# Patient Record
Sex: MALE | Race: WHITE | Employment: FULL TIME | URBAN - METROPOLITAN AREA
[De-identification: names, ages, dates, MRNs, and addresses within clinical notes are randomized per-mention and may not be internally consistent; named-entity substitution may affect disease eponyms.]

---

## 2017-02-08 ENCOUNTER — TELEPHONE (OUTPATIENT)
Dept: FAMILY MEDICINE | Facility: CLINIC | Age: 22
End: 2017-02-08

## 2017-02-08 NOTE — TELEPHONE ENCOUNTER
Panel Management Review      Patient has the following on his problem list:     Depression / Dysthymia review  PHQ-9 SCORE 4/8/2016 8/22/2016 12/3/2016   Total Score - - -   Total Score 13 6 9      Patient is due for:  PHQ9      Composite cancer screening  Chart review shows that this patient is due/due soon for the following None  Summary:    Patient is due/failing the following:   PHQ9    Action needed:   Patient needs to do PHQ9.    Type of outreach:    Phone, spoke to patient.  PHQ-9 completed    Questions for provider review:    None                                                                                                                                    Madhuri Nowak CMA       Chart routed to Care Team .

## 2017-02-09 ASSESSMENT — PATIENT HEALTH QUESTIONNAIRE - PHQ9: SUM OF ALL RESPONSES TO PHQ QUESTIONS 1-9: 10

## 2017-06-03 ENCOUNTER — HEALTH MAINTENANCE LETTER (OUTPATIENT)
Age: 22
End: 2017-06-03

## 2017-10-13 ENCOUNTER — OFFICE VISIT (OUTPATIENT)
Dept: FAMILY MEDICINE | Facility: CLINIC | Age: 22
End: 2017-10-13
Payer: COMMERCIAL

## 2017-10-13 VITALS
WEIGHT: 216 LBS | SYSTOLIC BLOOD PRESSURE: 124 MMHG | TEMPERATURE: 97.9 F | RESPIRATION RATE: 20 BRPM | BODY MASS INDEX: 30.92 KG/M2 | DIASTOLIC BLOOD PRESSURE: 76 MMHG | HEART RATE: 105 BPM | HEIGHT: 70 IN | OXYGEN SATURATION: 96 %

## 2017-10-13 DIAGNOSIS — J20.9 ACUTE BRONCHITIS, UNSPECIFIED ORGANISM: Primary | ICD-10-CM

## 2017-10-13 PROCEDURE — 99213 OFFICE O/P EST LOW 20 MIN: CPT | Performed by: FAMILY MEDICINE

## 2017-10-13 RX ORDER — PREDNISONE 20 MG/1
20 TABLET ORAL DAILY
Qty: 5 TABLET | Refills: 0 | Status: SHIPPED | OUTPATIENT
Start: 2017-10-13 | End: 2018-04-13

## 2017-10-13 RX ORDER — AZITHROMYCIN 250 MG/1
TABLET, FILM COATED ORAL
Qty: 6 TABLET | Refills: 0 | Status: SHIPPED | OUTPATIENT
Start: 2017-10-13 | End: 2018-04-13

## 2017-10-13 NOTE — PATIENT INSTRUCTIONS
Recommend flonase 2 sprays in each nostril once a day  Increase fluids  Humidfier use at night   Mucinex DM 1 tablet daily   Complete course of steroids and antibiotics  Follow up as needed    Bronchitis, Antibiotic Treatment (Adult)    Bronchitis is an infection of the air passages (bronchial tubes) in your lungs. It often occurs when you have a cold. This illness is contagious during the first few days and is spread through the air by coughing and sneezing, or by direct contact (touching the sick person and then touching your own eyes, nose, or mouth).  Symptoms of bronchitis include cough with mucus (phlegm) and low-grade fever. Bronchitis usually lasts 7 to 14 days. Mild cases can be treated with simple home remedies. More severe infection is treated with an antibiotic.  Home care  Follow these guidelines when caring for yourself at home:    If your symptoms are severe, rest at home for the first 2 to 3 days. When you go back to your usual activities, don't let yourself get too tired.    Do not smoke. Also avoid being exposed to secondhand smoke.    You may use over-the-counter medicines to control fever or pain, unless another medicine was prescribed. (Note: If you have chronic liver or kidney disease or have ever had a stomach ulcer or gastrointestinal bleeding, talk with your healthcare provider before using these medicines. Also talk to your provider if you are taking medicine to prevent blood clots.) Aspirin should never be given to anyone younger than 18 years of age who is ill with a viral infection or fever. It may cause severe liver or brain damage.    Your appetite may be poor, so a light diet is fine. Avoid dehydration by drinking 6 to 8 glasses of fluids per day (such as water, soft drinks, sports drinks, juices, tea, or soup). Extra fluids will help loosen secretions in the nose and lungs.    Over-the-counter cough, cold, and sore-throat medicines will not shorten the length of the illness, but they  may be helpful to reduce symptoms. (Note: Do not use decongestants if you have high blood pressure.)    Finish all antibiotic medicine. Do this even if you are feeling better after only a few days.  Follow-up care  Follow up with your healthcare provider, or as advised. If you had an X-ray or ECG (electrocardiogram), a specialist will review it. You will be notified of any new findings that may affect your care.  Note: If you are age 65 or older, or if you have a chronic lung disease or condition that affects your immune system, or you smoke, talk to your healthcare provider about having pneumococcal vaccinations and a yearly influenza vaccination (flu shot).  When to seek medical advice  Call your healthcare provider right away if any of these occur:    Fever of 100.4 F (38 C) or higher    Coughing up increased amounts of colored sputum    Weakness, drowsiness, headache, facial pain, ear pain, or a stiff neck  Call 911, or get immediate medical care  Contact emergency services right away if any of these occur.    Coughing up blood    Worsening weakness, drowsiness, headache, or stiff neck    Trouble breathing, wheezing, or pain with breathing  Date Last Reviewed: 9/13/2015 2000-2017 [employer]. [address] [place] [address], [place] [address]. All rights reserved. This information is not intended as a substitute for professional medical care. Always follow your healthcare professional's instructions.

## 2017-10-13 NOTE — NURSING NOTE
"Chief Complaint   Patient presents with     Cough     cough x2 weeks       Initial /76 (BP Location: Right arm, Patient Position: Chair, Cuff Size: Adult Large)  Pulse 105  Temp 97.9  F (36.6  C) (Oral)  Resp 20  Ht 5' 10\" (1.778 m)  Wt 216 lb (98 kg)  SpO2 96%  BMI 30.99 kg/m2 Estimated body mass index is 30.99 kg/(m^2) as calculated from the following:    Height as of this encounter: 5' 10\" (1.778 m).    Weight as of this encounter: 216 lb (98 kg).  Medication Reconciliation: complete     Brittany Honeycutt/JHONY  Alfred---[facility]      "

## 2017-10-13 NOTE — MR AVS SNAPSHOT
After Visit Summary   10/13/2017    Alex Mensah    MRN: [id number]           Patient Information     Date Of Birth          1995        Visit Information        Provider Department      10/13/2017 4:00 PM Zoe Schneider MD [facility]        Today's Diagnoses     Acute bronchitis, unspecified organism    -  1      Care Instructions    Recommend flonase 2 sprays in each nostril once a day  Increase fluids  Humidfier use at night   Mucinex DM 1 tablet daily   Complete course of steroids and antibiotics  Follow up as needed    Bronchitis, Antibiotic Treatment (Adult)    Bronchitis is an infection of the air passages (bronchial tubes) in your lungs. It often occurs when you have a cold. This illness is contagious during the first few days and is spread through the air by coughing and sneezing, or by direct contact (touching the sick person and then touching your own eyes, nose, or mouth).  Symptoms of bronchitis include cough with mucus (phlegm) and low-grade fever. Bronchitis usually lasts 7 to 14 days. Mild cases can be treated with simple home remedies. More severe infection is treated with an antibiotic.  Home care  Follow these guidelines when caring for yourself at home:    If your symptoms are severe, rest at home for the first 2 to 3 days. When you go back to your usual activities, don't let yourself get too tired.    Do not smoke. Also avoid being exposed to secondhand smoke.    You may use over-the-counter medicines to control fever or pain, unless another medicine was prescribed. (Note: If you have chronic liver or kidney disease or have ever had a stomach ulcer or gastrointestinal bleeding, talk with your healthcare provider before using these medicines. Also talk to your provider if you are taking medicine to prevent blood clots.) Aspirin should never be given to anyone younger than 18 years of age who is ill with a viral infection or fever. It may  cause severe liver or brain damage.    Your appetite may be poor, so a light diet is fine. Avoid dehydration by drinking 6 to 8 glasses of fluids per day (such as water, soft drinks, sports drinks, juices, tea, or soup). Extra fluids will help loosen secretions in the nose and lungs.    Over-the-counter cough, cold, and sore-throat medicines will not shorten the length of the illness, but they may be helpful to reduce symptoms. (Note: Do not use decongestants if you have high blood pressure.)    Finish all antibiotic medicine. Do this even if you are feeling better after only a few days.  Follow-up care  Follow up with your healthcare provider, or as advised. If you had an X-ray or ECG (electrocardiogram), a specialist will review it. You will be notified of any new findings that may affect your care.  Note: If you are age 65 or older, or if you have a chronic lung disease or condition that affects your immune system, or you smoke, talk to your healthcare provider about having pneumococcal vaccinations and a yearly influenza vaccination (flu shot).  When to seek medical advice  Call your healthcare provider right away if any of these occur:    Fever of 100.4 F (38 C) or higher    Coughing up increased amounts of colored sputum    Weakness, drowsiness, headache, facial pain, ear pain, or a stiff neck  Call 911, or get immediate medical care  Contact emergency services right away if any of these occur.    Coughing up blood    Worsening weakness, drowsiness, headache, or stiff neck    Trouble breathing, wheezing, or pain with breathing  Date Last Reviewed: 9/13/2015 2000-2017 [employer]. [address] [place] [address], [place] [address]. All rights reserved. This information is not intended as a substitute for professional medical care. Always follow your healthcare professional's instructions.                Follow-ups after your visit        Who to contact     If you have questions or need follow up  "information about today's clinic visit or your schedule please contact [facility] directly at [phone].  Normal or non-critical lab and imaging results will be communicated to you by uberlifehart, letter or phone within 4 business days after the clinic has received the results. If you do not hear from us within 7 days, please contact the clinic through uberlifehart or phone. If you have a critical or abnormal lab result, we will notify you by phone as soon as possible.  Submit refill requests through [employer] or call your pharmacy and they will forward the refill request to us. Please allow 3 business days for your refill to be completed.          Additional Information About Your Visit        uberlifeharTherMark Information     [employer] gives you secure access to your electronic health record. If you see a primary care provider, you can also send messages to your care team and make appointments. If you have questions, please call your primary care clinic.  If you do not have a primary care provider, please call [phone] and they will assist you.        Care EveryWhere ID     This is your Care EveryWhere ID. This could be used by other organizations to access your [place] medical records  [id number]        Your Vitals Were     Pulse Temperature Respirations Height Pulse Oximetry BMI (Body Mass Index)    105 97.9  F (36.6  C) (Oral) 20 5' 10\" (1.778 m) 96% 30.99 kg/m2       Blood Pressure from Last 3 Encounters:   10/13/17 124/76   11/25/16 134/87   08/22/16 132/82    Weight from Last 3 Encounters:   10/13/17 216 lb (98 kg)   11/25/16 201 lb (91.2 kg)   08/22/16 204 lb (92.5 kg)              Today, you had the following     No orders found for display         Today's Medication Changes          These changes are accurate as of: 10/13/17  4:26 PM.  If you have any questions, ask your nurse or doctor.               Start taking these medicines.        Dose/Directions    azithromycin 250 MG tablet   Commonly known " as:  ZITHROMAX   Used for:  Acute bronchitis, unspecified organism   Started by:  Zoe Schneider MD        Two tablets first day, then one tablet daily for four days.   Quantity:  6 tablet   Refills:  0       predniSONE 20 MG tablet   Commonly known as:  DELTASONE   Used for:  Acute bronchitis, unspecified organism   Started by:  Zoe Schneider MD        Dose:  20 mg   Take 1 tablet (20 mg) by mouth daily   Quantity:  5 tablet   Refills:  0            Where to get your medicines      These medications were [facility] to [facility]/pharm[facility] #[id number] - [place], [place] [facility] [address]  [address] [address]     Phone:  [phone]     azithromycin 250 MG tablet    predniSONE 20 MG tablet                Primary Care Provider Office Phone # Fax #    Paul Nilsa Bravo PA-C [phone]       [address] [address]        Equal Access to Services     MICHAEL [facility] Center[facility]RIANNA : Hadii silvia ku hadasho Soomaali, waaxda luqadaha, qaybta kaalmada adeegyada, waxay kyliein haygale ruiz . So [facility] [phone].    ATENCIÓN: Si habla español, tiene a sadler disposición servicios gratuitos de asistencia lingüística. Llame al [phone].    We comply with applicable federal civil rights laws and [place] laws. We do not discriminate on the basis of race, color, national origin, age, disability, sex, sexual orientation, or gender identity.            Thank you!     Thank you for choosing [facility]  for your care. Our goal is always to provide you with excellent care. Hearing back from our patients is one way we can continue to improve our services. Please take a few minutes to complete the written survey that you may receive in the mail after your visit with us. Thank you!             Your Updated Medication List - Protect others around you: Learn how to safely use, store and throw away your medicines at www.disposemymeds.org.           This list is accurate as of: 10/13/17  4:26 PM.  Always use your most recent med list.                   Brand Name Dispense Instructions for use Diagnosis    azithromycin 250 MG tablet    ZITHROMAX    6 tablet    Two tablets first day, then one tablet daily for four days.    Acute bronchitis, unspecified organism       predniSONE 20 MG tablet    DELTASONE    5 tablet    Take 1 tablet (20 mg) by mouth daily    Acute bronchitis, unspecified organism

## 2017-10-13 NOTE — PROGRESS NOTES
"  SUBJECTIVE:   Alex Mensah is a 21 year old male who presents to clinic today for the following health issues:      Acute Illness   Acute illness concerns: cough and chest congestion  Onset: x2 weeks    Fever: no    Chills/Sweats: no    Headache (location?): no    Sinus Pressure:no    Conjunctivitis:  no    Ear Pain: no    Rhinorrhea: no    Congestion: no    Sore Throat: no    PND: YES     Cough: YES    Wheeze: no     Shortness of breath: mostly with coughing     Decreased Appetite: YES    Nausea: no    Vomiting: no    Diarrhea:  no    Dysuria/Freq.: no    Fatigue/Achiness: no    Sick/Strep Exposure: no     Therapies Tried and outcome: water, cough drops, mucinex             Problem list and histories reviewed & adjusted, as indicated.  Additional history: as documented    Patient Active Problem List   Diagnosis     Pes planus     Elbow fracture, left     Major depression in partial remission (H)     Allergic rhinitis due to other allergen (ALLERGY)     Warts     Common wart     GONZALEZ (generalized anxiety disorder)     Past Surgical History:   Procedure Laterality Date     ELBOW SURGERY  1/2011    Left elbow fx- ORIF.        Social History   Substance Use Topics     Smoking status: Never Smoker     Smokeless tobacco: Never Used     Alcohol use 0.0 oz/week     0 Standard drinks or equivalent per week      Comment: occ     Family History   Problem Relation Age of Onset     Myocardial Infarction Paternal Grandfather              Reviewed and updated as needed this visit by clinical staffTobacco  Allergies       Reviewed and updated as needed this visit by Provider         ROS:  Constitutional, HEENT,pulmonary systems are negative, except as otherwise noted.      OBJECTIVE:   /76 (BP Location: Right arm, Patient Position: Chair, Cuff Size: Adult Large)  Pulse 105  Temp 97.9  F (36.6  C) (Oral)  Resp 20  Ht 5' 10\" (1.778 m)  Wt 216 lb (98 kg)  SpO2 96%  BMI 30.99 kg/m2  Body mass index is 30.99 " kg/(m^2).  GENERAL: healthy and alert  HENT: ear canals and TM's normal, nose and mouth without ulcers or lesions  NECK: no adenopathy, no asymmetry, masses, or scars and thyroid normal to palpation  RESP: mild coarse breath sounds, no wheezing, significant coughing fits with some shortness of breath   CV: regular rate and rhythm, normal S1 S2, no S3 or S4, no murmur, click or rub, no peripheral edema and peripheral pulses strong    Diagnostic Test Results:  none     ASSESSMENT/PLAN:       1. Acute bronchitis, unspecified organism  - will cover with Abx due to length of symptoms.   - azithromycin (ZITHROMAX) 250 MG tablet; Two tablets first day, then one tablet daily for four days.  Dispense: 6 tablet; Refill: 0  - predniSONE (DELTASONE) 20 MG tablet; Take 1 tablet (20 mg) by mouth daily  Dispense: 5 tablet; Refill: 0    See Patient Instructions    Zoe Schneider MD  [facility]

## 2018-04-13 ENCOUNTER — OFFICE VISIT (OUTPATIENT)
Dept: FAMILY MEDICINE | Facility: CLINIC | Age: 23
End: 2018-04-13
Payer: COMMERCIAL

## 2018-04-13 VITALS
WEIGHT: 208 LBS | BODY MASS INDEX: 29.78 KG/M2 | DIASTOLIC BLOOD PRESSURE: 83 MMHG | TEMPERATURE: 97.5 F | HEIGHT: 70 IN | SYSTOLIC BLOOD PRESSURE: 137 MMHG | RESPIRATION RATE: 16 BRPM | HEART RATE: 71 BPM

## 2018-04-13 DIAGNOSIS — R09.82 POST-NASAL DRIP: ICD-10-CM

## 2018-04-13 DIAGNOSIS — R07.0 THROAT PAIN: Primary | ICD-10-CM

## 2018-04-13 LAB
DEPRECATED S PYO AG THROAT QL EIA: NORMAL
SPECIMEN SOURCE: NORMAL

## 2018-04-13 PROCEDURE — 99213 OFFICE O/P EST LOW 20 MIN: CPT | Performed by: FAMILY MEDICINE

## 2018-04-13 PROCEDURE — 87081 CULTURE SCREEN ONLY: CPT | Performed by: FAMILY MEDICINE

## 2018-04-13 PROCEDURE — 87880 STREP A ASSAY W/OPTIC: CPT | Performed by: FAMILY MEDICINE

## 2018-04-13 RX ORDER — FLUTICASONE PROPIONATE 50 MCG
1-2 SPRAY, SUSPENSION (ML) NASAL DAILY
Qty: 1 BOTTLE | Refills: 11 | Status: SHIPPED | OUTPATIENT
Start: 2018-04-13 | End: 2021-09-29

## 2018-04-13 ASSESSMENT — ANXIETY QUESTIONNAIRES
6. BECOMING EASILY ANNOYED OR IRRITABLE: NOT AT ALL
1. FEELING NERVOUS, ANXIOUS, OR ON EDGE: SEVERAL DAYS
GAD7 TOTAL SCORE: 2
5. BEING SO RESTLESS THAT IT IS HARD TO SIT STILL: NOT AT ALL
7. FEELING AFRAID AS IF SOMETHING AWFUL MIGHT HAPPEN: NOT AT ALL
3. WORRYING TOO MUCH ABOUT DIFFERENT THINGS: NOT AT ALL
2. NOT BEING ABLE TO STOP OR CONTROL WORRYING: SEVERAL DAYS
IF YOU CHECKED OFF ANY PROBLEMS ON THIS QUESTIONNAIRE, HOW DIFFICULT HAVE THESE PROBLEMS MADE IT FOR YOU TO DO YOUR WORK, TAKE CARE OF THINGS AT HOME, OR GET ALONG WITH OTHER PEOPLE: NOT DIFFICULT AT ALL

## 2018-04-13 ASSESSMENT — PATIENT HEALTH QUESTIONNAIRE - PHQ9: 5. POOR APPETITE OR OVEREATING: NOT AT ALL

## 2018-04-13 NOTE — PROGRESS NOTES
"  SUBJECTIVE:   Alex Mensah is a 22 year old male who presents to clinic today for the following health issues:    Patient complains of sore throat, congestion, and productive cough for a week. He also notes \"raspy\" voice over past couple of days. He has been treating with mucinex and zyrtec.     Problem list and histories reviewed & adjusted, as indicated.  Additional history: none    Reviewed and updated as needed this visit by clinical staff  Tobacco  Allergies  Meds  Med Hx  Surg Hx  Fam Hx  Soc Hx       Past Medical History:   Diagnosis Date     Cellulitis      Common wart 8/22/2016     GONZALEZ (generalized anxiety disorder) 11/25/2016     Allergic Rhinitis  Past Surgical History:   Procedure Laterality Date     ELBOW SURGERY  1/2011    Left elbow fx- ORIF.        Family History   Problem Relation Age of Onset     Myocardial Infarction Paternal Grandfather        Social History   Substance Use Topics     Smoking status: Never Smoker     Smokeless tobacco: Never Used     Alcohol use 0.0 oz/week     0 Standard drinks or equivalent per week      Comment: occ       Reviewed and updated as needed this visit by Provider         ROS:  Positive for rhinitis  Negative for fevers, GI sx    This document serves as a record of the services and decisions personally performed and made by Dipak Negro MD. It was created on his behalf by Julianne Garcia, a trained medical scribe.  The creation of this document is based on the scribe's personal observations and the provider's statements to the medical scribe.  Julianne Garcia, April 13, 2018 12:11 PM    OBJECTIVE:     /83 (BP Location: Right arm, Patient Position: Chair, Cuff Size: Adult Regular)  Pulse 71  Temp 97.5  F (36.4  C) (Oral)  Resp 16  Ht 1.778 m (5' 10\")  Wt 94.3 kg (208 lb)  BMI 29.84 kg/m2  Body mass index is 29.84 kg/(m^2).    Exam  ENT: posterior pharyngeal lymphoid streaks.  CHEST: Clear to auscultation, respirations unlabored      Strep test " negative   ASSESSMENT/PLAN:   ASSESSMENT / PLAN:  (R07.0) Throat pain  (primary encounter diagnosis)  Comment:   Plan: Strep, Rapid Screen, Beta strep group A culture            (R09.82) Post-nasal drip  Comment: Versus allergic or viral Rx  Plan: fluticasone (FLONASE) 50 MCG/ACT spray             The information in this document, created by the medical scribe for me, accurately reflects the services I personally performed and the decisions made by me. I have reviewed and approved this document for accuracy prior to leaving the patient care area.  Dipak Negro MD April 13, 2018 12:11 PM    Dipak Negro MD  [facility]

## 2018-04-13 NOTE — NURSING NOTE
"Chief Complaint   Patient presents with     URI     Sinuses x week       Initial /83 (BP Location: Right arm, Patient Position: Chair, Cuff Size: Adult Regular)  Pulse 71  Temp 97.5  F (36.4  C) (Oral)  Resp 16  Ht 5' 10\" (1.778 m)  Wt 208 lb (94.3 kg)  BMI 29.84 kg/m2 Estimated body mass index is 29.84 kg/(m^2) as calculated from the following:    Height as of this encounter: 5' 10\" (1.778 m).    Weight as of this encounter: 208 lb (94.3 kg).  Medication Reconciliation: complete RT ARM Kiesha Weaver MA      "

## 2018-04-13 NOTE — MR AVS SNAPSHOT
"              After Visit Summary   4/13/2018    Alex Mensah    MRN: [id number]           Patient Information     Date Of Birth          1995        Visit Information        Provider Department      4/13/2018 11:45 AM Dipak Negro MD [facility]        Today's Diagnoses     Throat pain    -  1    Post-nasal drip           Follow-ups after your visit        Who to contact     If you have questions or need follow up information about today's clinic visit or your schedule please contact [facility] directly at [phone].  Normal or non-critical lab and imaging results will be communicated to you by Urbasolarhart, letter or phone within 4 business days after the clinic has received the results. If you do not hear from us within 7 days, please contact the clinic through [employer] or phone. If you have a critical or abnormal lab result, we will notify you by phone as soon as possible.  Submit refill requests through [employer] or call your pharmacy and they will forward the refill request to us. Please allow 3 business days for your refill to be completed.          Additional Information About Your Visit        MyChart Information     [employer] gives you secure access to your electronic health record. If you see a primary care provider, you can also send messages to your care team and make appointments. If you have questions, please call your primary care clinic.  If you do not have a primary care provider, please call [phone] and they will assist you.        Care EveryWhere ID     This is your Care EveryWhere ID. This could be used by other organizations to access your [place] medical records  [id number]        Your Vitals Were     Pulse Temperature Respirations Height BMI (Body Mass Index)       71 97.5  F (36.4  C) (Oral) 16 5' 10\" (1.778 m) 29.84 kg/m2        Blood Pressure from Last 3 Encounters:   04/13/18 137/83   10/13/17 124/76   11/25/16 134/87    Weight from Last " 3 Encounters:   04/13/18 208 lb (94.3 kg)   10/13/17 216 lb (98 kg)   11/25/16 201 lb (91.2 kg)              We Performed the Following     Beta strep group A culture     Strep, Rapid Screen          Today's Medication Changes          These changes are accurate as of 4/13/18  8:04 PM.  If you have any questions, ask your nurse or doctor.               Start taking these medicines.        Dose/Directions    fluticasone 50 MCG/ACT spray   Commonly known as:  FLONASE   Used for:  Post-nasal drip   Started by:  Dipak Negro MD        Dose:  1-2 spray   Spray 1-2 sprays into both nostrils daily   Quantity:  1 Bottle   Refills:  11            Where to get your medicines      These medications were sent to [facility] [address] [facility] [address]  [address] , [facility] [address]     Phone:  [phone]     fluticasone 50 MCG/ACT spray                Primary Care Provider Office Phone # Fax #    Paul Nilsa Bravo PA-C [phone]       [address] [address]        Equal Access to Services     [facility] Center[facility]RIANNA AH: Hadii silvia salazar hadasho Soomaali, waaxda luqadaha, qaybta kaalmada adeegyada, shane cansecon srini esteves. So [facility] [phone].    ATENCIÓN: Si habla español, tiene a sadler disposición servicios gratuitos de asistencia lingüística. [facility] [phone].    We comply with applicable federal civil rights laws and [place] laws. We do not discriminate on the basis of race, color, national origin, age, disability, sex, sexual orientation, or gender identity.            Thank you!     Thank you for choosing [facility]  for your care. Our goal is always to provide you with excellent care. Hearing back from our patients is one way we can continue to improve our services. Please take a few minutes to complete the written survey that you may receive in the mail after  your visit with us. Thank you!             Your Updated Medication List - Protect others around you: Learn how to safely use, store and throw away your medicines at www.disposemymeds.org.          This list is accurate as of 4/13/18  8:04 PM.  Always use your most recent med list.                   Brand Name Dispense Instructions for use Diagnosis    fluticasone 50 MCG/ACT spray    FLONASE    1 Bottle    Spray 1-2 sprays into both nostrils daily    Post-nasal drip

## 2018-04-14 LAB
BACTERIA SPEC CULT: NORMAL
SPECIMEN SOURCE: NORMAL

## 2018-04-14 ASSESSMENT — ANXIETY QUESTIONNAIRES: GAD7 TOTAL SCORE: 2

## 2018-04-14 ASSESSMENT — PATIENT HEALTH QUESTIONNAIRE - PHQ9: SUM OF ALL RESPONSES TO PHQ QUESTIONS 1-9: 4

## 2018-06-12 ENCOUNTER — OFFICE VISIT (OUTPATIENT)
Dept: FAMILY MEDICINE | Facility: CLINIC | Age: 23
End: 2018-06-12
Payer: COMMERCIAL

## 2018-06-12 VITALS
BODY MASS INDEX: 29.84 KG/M2 | WEIGHT: 208 LBS | RESPIRATION RATE: 16 BRPM | HEART RATE: 93 BPM | TEMPERATURE: 98.4 F | OXYGEN SATURATION: 96 % | DIASTOLIC BLOOD PRESSURE: 80 MMHG | SYSTOLIC BLOOD PRESSURE: 126 MMHG

## 2018-06-12 DIAGNOSIS — L03.116 CELLULITIS OF LEFT LOWER EXTREMITY: Primary | ICD-10-CM

## 2018-06-12 PROCEDURE — 87070 CULTURE OTHR SPECIMN AEROBIC: CPT | Performed by: FAMILY MEDICINE

## 2018-06-12 PROCEDURE — 87186 SC STD MICRODIL/AGAR DIL: CPT | Mod: 59 | Performed by: FAMILY MEDICINE

## 2018-06-12 PROCEDURE — 99214 OFFICE O/P EST MOD 30 MIN: CPT | Performed by: FAMILY MEDICINE

## 2018-06-12 PROCEDURE — 87077 CULTURE AEROBIC IDENTIFY: CPT | Performed by: FAMILY MEDICINE

## 2018-06-12 RX ORDER — MUPIROCIN 20 MG/G
OINTMENT TOPICAL 3 TIMES DAILY
Qty: 22 G | Refills: 1 | Status: SHIPPED | OUTPATIENT
Start: 2018-06-12 | End: 2018-06-17

## 2018-06-12 RX ORDER — SULFAMETHOXAZOLE/TRIMETHOPRIM 800-160 MG
1 TABLET ORAL 2 TIMES DAILY
Qty: 20 TABLET | Refills: 0 | Status: SHIPPED | OUTPATIENT
Start: 2018-06-12 | End: 2019-07-24

## 2018-06-12 NOTE — PROGRESS NOTES
SUBJECTIVE:   Alex Mensah is a 22 year old male presenting with a chief complaint of   Chief Complaint   Patient presents with     Blister     left heel        He is an established patient of [facility].        Review of Systems   Musculoskeletal:        Pain along the left Achilles tendon.  This starts at the insertion working its way up.    There is decreased range of motion secondary to pain   Skin: Positive for rash.   All other systems reviewed and are negative.      Past Medical History:   Diagnosis Date     Cellulitis      Common wart 8/22/2016     GONZALEZ (generalized anxiety disorder) 11/25/2016     Family History   Problem Relation Age of Onset     Myocardial Infarction Paternal Grandfather      Current Outpatient Prescriptions   Medication Sig Dispense Refill     mupirocin (BACTROBAN) 2 % ointment Apply topically 3 times daily for 5 days 22 g 1     sulfamethoxazole-trimethoprim (BACTRIM DS/SEPTRA DS) 800-160 MG per tablet Take 1 tablet by mouth 2 times daily 20 tablet 0     fluticasone (FLONASE) 50 MCG/ACT spray Spray 1-2 sprays into both nostrils daily 1 Bottle 11     Social History   Substance Use Topics     Smoking status: Never Smoker     Smokeless tobacco: Never Used     Alcohol use 0.0 oz/week     0 Standard drinks or equivalent per week      Comment: occ       OBJECTIVE  /80  Pulse 93  Temp 98.4  F (36.9  C) (Oral)  Resp 16  Wt 208 lb (94.3 kg)  SpO2 96%  BMI 29.84 kg/m2    Physical Exam   Constitutional: He is oriented to person, place, and time. He appears well-developed.   HENT:   Head: Normocephalic.   Eyes: EOM are normal. Pupils are equal, round, and reactive to light.   Neck: Normal range of motion. Neck supple.   Cardiovascular: Normal rate and regular rhythm.    Pulmonary/Chest: Effort normal and breath sounds normal.   Abdominal: Soft.   Musculoskeletal: Normal range of motion.   Neurological: He is alert and oriented to person, place, and time.   Nursing note and vitals  reviewed.      Labs:  No results found for this or any previous visit (from the past 24 hour(s)).    X-Ray was not done.    ASSESSMENT:  1. Cellulitis; infection that seems to be spreading along the left Achilles tendon and heel.  She was were causing a pressure sore and now this is obviously become infected.    He was taken the procedure room the area was cleaned prepped and draped and we did remove pustular material that was sent for culture to the laboratory.    He has had staph infections in the past I am not sure if this was MRSA.    2. Tendinitis of achilles.      However we will place him on Septra as well as Bactroban to the area and he is to return to his primary care in the next 2 weeks.    In addition he should return within the next 48 hours if not improving or if this appears to be worse.      Medical Decision Making:    Differential Diagnosis:  Rash: Cellulitis  Dermatitis  Folliculitis  Inflammation    Serious Comorbid Conditions:  Adult:  None    PLAN:    URI Adult:  Tylenol and Ibuprofen    Followup:    If not improving or if condition worsens, follow up with your Primary Care Provider    There are no Patient Instructions on file for this visit.

## 2018-06-12 NOTE — MR AVS SNAPSHOT
After Visit Summary   6/12/2018    Alex Mensah    MRN: [id number]           Patient Information     Date Of Birth          1995        Visit Information        Provider Department      6/12/2018 4:30 PM Bassam Mckeon MD [facility]        Today's Diagnoses     Cellulitis of right lower extremity    -  1       Follow-ups after your visit        Who to contact     If you have questions or need follow up information about today's clinic visit or your schedule please contact [facility] directly at [phone].  Normal or non-critical lab and imaging results will be communicated to you by [employer], letter or phone within 4 business days after the clinic has received the results. If you do not hear from us within 7 days, please contact the clinic through [facility] [employer] or phone. If you have a critical or abnormal lab result, we will notify you by phone as soon as possible.  Submit refill requests through [employer] or call your pharmacy and they will forward the refill request to us. Please allow 3 business days for your refill to be completed.          Additional Information About Your Visit        MyChart Information     [employer] gives you secure access to your electronic health record. If you see a primary care provider, you can also send messages to your care team and make appointments. If you have questions, please call your primary care clinic.  If you do not have a primary care provider, please call [phone] and they will assist you.        Care EveryWhere ID     This is your Care EveryWhere ID. This could be used by other organizations to access your [place] medical records  [id number]        Your Vitals Were     Pulse Temperature Respirations Pulse Oximetry BMI (Body Mass Index)       93 98.4  F (36.9  C) (Oral) 16 96% 29.84 kg/m2        Blood Pressure from Last 3 Encounters:   06/12/18 126/80   04/13/18 137/83   10/13/17 124/76    Weight from Last 3  Encounters:   06/12/18 208 lb (94.3 kg)   04/13/18 208 lb (94.3 kg)   10/13/17 216 lb (98 kg)              Today, you had the following     No orders found for display         Today's Medication Changes          These changes are accurate as of 6/12/18  5:12 PM.  If you have any questions, ask your nurse or doctor.               Start taking these medicines.        Dose/Directions    mupirocin 2 % ointment   Commonly known as:  BACTROBAN   Used for:  Cellulitis of right lower extremity   Started by:  Bassam Mckeon MD        Apply topically 3 times daily for 5 days   Quantity:  22 g   Refills:  1       sulfamethoxazole-trimethoprim 800-160 MG per tablet   Commonly known as:  BACTRIM DS/SEPTRA DS   Used for:  Cellulitis of right lower extremity   Started by:  Bassam Mckeon MD        Dose:  1 tablet   Take 1 tablet by mouth 2 times daily   Quantity:  20 tablet   Refills:  0            Where to get your medicines      These medications were sent to "[facility] [address] [place] [address]  [address] [facility] OF  [address]  [address]  [address], [facility] [address]     Phone:  [phone]     mupirocin 2 % ointment    sulfamethoxazole-trimethoprim 800-160 MG per tablet                Primary Care Provider Office Phone # Fax #    Paul Nilsa Bravo PA-C [phone]       [address] [address]        Equal Access to Services     [facility] HospitalBRIANNA AH: Hadii silvia ku hadasho Soomaali, waaxda luqadaha, qaybta kaalmada adeegyada, waxay skye ruiz ah. So [facility] [phone].    ATENCIÓN: Si habla español, tiene a sadler disposición servicios gratuitos de asistencia lingüística. Carmename al [phone].    We comply with applicable federal civil rights laws and [place] laws. We do not discriminate on the basis of race, color, national origin, age, disability, sex, sexual orientation, or gender identity.            Thank you!      Thank you for choosing [facility]  for your care. Our goal is always to provide you with excellent care. Hearing back from our patients is one way we can continue to improve our services. Please take a few minutes to complete the written survey that you may receive in the mail after your visit with us. Thank you!             Your Updated Medication List - Protect others around you: Learn how to safely use, store and throw away your medicines at www.disposemymeds.org.          This list is accurate as of 6/12/18  5:12 PM.  Always use your most recent med list.                   Brand Name Dispense Instructions for use Diagnosis    fluticasone 50 MCG/ACT spray    FLONASE    1 Bottle    Spray 1-2 sprays into both nostrils daily    Post-nasal drip       mupirocin 2 % ointment    BACTROBAN    22 g    Apply topically 3 times daily for 5 days    Cellulitis of right lower extremity       sulfamethoxazole-trimethoprim 800-160 MG per tablet    BACTRIM DS/SEPTRA DS    20 tablet    Take 1 tablet by mouth 2 times daily    Cellulitis of right lower extremity

## 2018-06-19 LAB
BACTERIA SPEC CULT: ABNORMAL
BACTERIA SPEC CULT: ABNORMAL
Lab: ABNORMAL
SPECIMEN SOURCE: ABNORMAL

## 2019-06-26 ENCOUNTER — OFFICE VISIT (OUTPATIENT)
Dept: FAMILY MEDICINE | Facility: CLINIC | Age: 24
End: 2019-06-26
Payer: COMMERCIAL

## 2019-06-26 VITALS
HEIGHT: 70 IN | TEMPERATURE: 98 F | DIASTOLIC BLOOD PRESSURE: 72 MMHG | WEIGHT: 187 LBS | SYSTOLIC BLOOD PRESSURE: 110 MMHG | RESPIRATION RATE: 16 BRPM | HEART RATE: 73 BPM | BODY MASS INDEX: 26.77 KG/M2

## 2019-06-26 DIAGNOSIS — G47.00 INSOMNIA, UNSPECIFIED TYPE: ICD-10-CM

## 2019-06-26 DIAGNOSIS — R79.89 ELEVATED TSH: Primary | ICD-10-CM

## 2019-06-26 DIAGNOSIS — B07.8 COMMON WART: ICD-10-CM

## 2019-06-26 PROCEDURE — 84443 ASSAY THYROID STIM HORMONE: CPT | Performed by: FAMILY MEDICINE

## 2019-06-26 PROCEDURE — 17110 DESTRUCTION B9 LES UP TO 14: CPT | Performed by: FAMILY MEDICINE

## 2019-06-26 PROCEDURE — 36415 COLL VENOUS BLD VENIPUNCTURE: CPT | Performed by: FAMILY MEDICINE

## 2019-06-26 PROCEDURE — 99213 OFFICE O/P EST LOW 20 MIN: CPT | Mod: 25 | Performed by: FAMILY MEDICINE

## 2019-06-26 RX ORDER — TRAZODONE HYDROCHLORIDE 50 MG/1
25-50 TABLET, FILM COATED ORAL AT BEDTIME
Qty: 60 TABLET | Refills: 0 | Status: SHIPPED | OUTPATIENT
Start: 2019-06-26 | End: 2019-09-09

## 2019-06-26 ASSESSMENT — PATIENT HEALTH QUESTIONNAIRE - PHQ9
SUM OF ALL RESPONSES TO PHQ QUESTIONS 1-9: 5
10. IF YOU CHECKED OFF ANY PROBLEMS, HOW DIFFICULT HAVE THESE PROBLEMS MADE IT FOR YOU TO DO YOUR WORK, TAKE CARE OF THINGS AT HOME, OR GET ALONG WITH OTHER PEOPLE: NOT DIFFICULT AT ALL
SUM OF ALL RESPONSES TO PHQ QUESTIONS 1-9: 5

## 2019-06-26 ASSESSMENT — ANXIETY QUESTIONNAIRES
6. BECOMING EASILY ANNOYED OR IRRITABLE: NOT AT ALL
7. FEELING AFRAID AS IF SOMETHING AWFUL MIGHT HAPPEN: NOT AT ALL
1. FEELING NERVOUS, ANXIOUS, OR ON EDGE: SEVERAL DAYS
GAD7 TOTAL SCORE: 2
GAD7 TOTAL SCORE: 2
4. TROUBLE RELAXING: NOT AT ALL
GAD7 TOTAL SCORE: 2
7. FEELING AFRAID AS IF SOMETHING AWFUL MIGHT HAPPEN: NOT AT ALL
5. BEING SO RESTLESS THAT IT IS HARD TO SIT STILL: NOT AT ALL
3. WORRYING TOO MUCH ABOUT DIFFERENT THINGS: SEVERAL DAYS
2. NOT BEING ABLE TO STOP OR CONTROL WORRYING: NOT AT ALL

## 2019-06-26 ASSESSMENT — MIFFLIN-ST. JEOR: SCORE: 1849.48

## 2019-06-26 NOTE — PATIENT INSTRUCTIONS
Patient Education   start trazodone at 1/2 tablet and increase to 1 tablet if needed  Follow up In 3-4 weeks or sooner if needed   Treating Warts    You and your healthcare provider can talk about what treatment may be best for your wart or warts. To get rid of your warts, your healthcare provider may need to try more than one type of treatment. The methods described below are often used to treat warts.  Types of treatment    Do nothing. Most warts will resolve within 2 years, even without treatment. So doing nothing is sometimes a good option. This is particularly true for smaller warts that are not causing symptoms.    Cryotherapy (liquid nitrogen). This kills skin cells by freezing them. It kills the warts and destroys skin infected by the wart-causing virus. This is done in your healthcare provider s office and will cause some discomfort. It may take several treatments over several weeks to get rid of the warts.    Topical medicines. Prescribed topical medicines can be put on the skin. These are usually applied in the healthcare provider's office. But some prescriptions may be applied at home.    Over-the-counter (OTC) topical treatments. OTC medicines that most often contain salicylic acid may be an option. These patches, liquids, and creams are used at home. The medicine is applied daily to the wart and nearby skin. It's usually left on overnight. The dead skin is filed down the next day. In 1 to 3 days, the procedure can be repeated. Topical treatments are sometimes combined with cryotherapy.    Electrodessication and curettage (ED & C).  For this procedure, the healthcare provider applies numbing medicine to the wart. Then the wart is scraped or cut off. This type of treatment is usually not the first line of therapy.    Laser surgery.  This can vaporize wart tissue or destroy the blood vessels that feed the wart. This is done in the healthcare provider's office.    Shots (injections). These can be used to  treat warts that don t respond to other treatments, such as stubborn or painful warts around the nails. This is done in the healthcare provider s office.  When to seek medical treatment  It s a good idea to have your healthcare provider check your warts. That way your provider can rule out any other skin problems. Sometimes a callous or a corn can look like a wart, but the treatments may differ. Treatment can also provide relief from warts that bleed, burn, hurt, or itch. Genital warts should always be treated. They can spread to other people through sexual contact. And they may cause genital or cervical cancer.  Getting good results  After having your warts treated, new warts may still appear. Don t be discouraged. Warts often come back. See your healthcare provider again to discuss this. Your provider can tell you about the treatments that most likely will help clear your skin of warts.   Date Last Reviewed: 2/1/2017 2000-2018 [employer]. [address] [place] [address]. All rights reserved. This information is not intended as a substitute for professional medical care. Always follow your healthcare professional's instructions.           Patient Education     Insomnia  Insomnia is repeated difficulty going to sleep or staying asleep, or both. Whether you have insomnia is not defined by a specific amount of sleep. Different people need different amounts of sleep, and you may need more or less sleep at different times of your life.  There are 3 major types of insomnia: short-term, chronic, and  other.  Short-term, or acute insomnia lasts less than 3 months. The symptoms are temporary and can be linked directly to a stressor, such as the death of a loved one, financial problems, or a new physical problem. Short-term insomnia stops when the stressor resolves or the person adapts to its presence.  Chronic insomnia occurs at least 3 times a week and lasts longer than 3 months. Chronic  insomnia can occur when either the cause of the sleeping problem is not clear, or the insomnia does not get better when the stressor is resolved. A number of other criteria are also used to make the diagnosis of chronic insomnia.    Other insomnia  is the third type of insomnia-related sleep disorders. This description applies to people who have problems getting to sleep or staying asleep, but don't meet all of the factors that describe either short-term or chronic insomnia.    Many things cause insomnia. Different people may have different causes. It can be from an underlying medical or psychological condition, or lifestyle. It can also be primary insomnia, which means no cause can be found.  Causes of insomnia include:    Chronic medical problems- heart disease, gastrointestinal problems, hormonal changes, breathing problems    Anxiety    Stress    Depression    Pain    Work schedule    Sleep apnea    Illegal drugs    Certain medicines  Many different medicines can affect your sleep, such as stimulants, caffeine, alcohol, some decongestants, and diet pills. Other medicines may include some types of blood pressure pills, steroids, asthma medicines, antihistamines, antidepressants, seizure medicines and statins. Not all of these will affect your sleep, and they shouldn t be stopped without talking to your doctor.  Symptoms of insomnia can include:    Lying awake for long periods at night before falling asleep    Waking up several times during the night    Waking up early in the morning and not being able to get back to sleep    Feeling tired and not refreshed by sleep    Not being able to function properly during the day and finding it hard to concentrate    Irritability    Tiredness and fatigue during the day  Home care    Review your medicines with your doctor or pharmacist to find out if they can cause insomnia. Not all medicines will affect your sleep, but they shouldn't be stopped without reviewing them with  your doctor. There may be serious side effects and consequences from suddenly stopping your medicines. Not taking them may cause strokes, heart attacks, and many other problems.    Caffeine, smoking, and alcohol also affect sleep. Limit your daily use and don't use these before bedtime. Alcohol may make you sleepy at first, but as its effects wear off, you may awaken a few hours later and have trouble returning to sleep.    Don't exercise, eat or drink large amounts of liquid within 2 hours of your bedtime.    Improve your sleep habits. Have a fixed bed and wake-up time. Try to keep noise, light and heat in your bedroom at a comfortable level. Try using earplugs or eyeshades if needed.     Don't watch TV in bed.    If you don't fall asleep within 30 minutes, try to relax by reading or listening to soft music.    Limit daytime napping to one 30 minute period, early in the day.    Get regular exercise. Find other ways to lessen your stress level.    If a medicine was prescribed to help reset your sleep patterns, take it as directed. Sleeping pills are intended for short-term use, only. If taken for too long, the effect wears off while the risk of physical addiction and psychological dependence increases.  Sleep diary  If the cause isn t obvious and it is not improving, try keeping a  sleep diary  for a couple of weeks. Include in it:    The time you go to bed    How long it takes to fall asleep    How many times you wake up    What time you wake up    Your meal times and what you eat    What time you drink alcohol and caffeine    Your exercise habits and times  Follow-up care  Follow up with your healthcare provider, or as advised. If an X-ray or CT scan was done, you will be notified if there is a change in the reading, especially if it affects treatment.  Call 911  Call 911 if any of these occur:    Trouble breathing    Confusion or trouble waking    Fainting or loss of consciousness    Rapid heart rate    New  chest, arm, shoulder, neck or upper back pain    Trouble with speech or vision, weakness of an arm or leg    Trouble walking or talking, loss of balance, numbness or weakness in one side of your body, facial droop  When to seek medical advice  Call your healthcare provider right away if any of these occur:    Extreme restlessness or irritability    Confusion or hallucinations (seeing or hearing things that are not there)    Anxiety, depression    Several days without sleeping  Date Last Reviewed: 5/1/2018 2000-2018 [employer]. [address] [place] [address]. All rights reserved. This information is not intended as a substitute for professional medical care. Always follow your healthcare professional's instructions.

## 2019-06-26 NOTE — PROGRESS NOTES
Subjective     Alex Mensah is a 23 year old male who presents to clinic today for the following health issues:    History of Present Illness        He eats 2-3 servings of fruits and vegetables daily.He consumes 0 sweetened beverage(s) daily.He is missing 7 dose(s) of medications per week.  He is not taking prescribed medications regularly due to other.     Thyroid Problem   Was seen in February/march - was told it wasn't quite elevated but was started on levothyroxine 25mcg daily to see if it would help with his sleep problem.   He has been out for about 3 weeks now and would like to know if it is really necessary to be on the medication as he did not see any improvement. Admits he has always had sleep problems although it seems to be worsening lately. He generally sleeps around 11 pm and then wakes up around 3-4 hrs later and then unable to go back to sleep. States he usually ends up laying in bed until alarm goes off around 9 am.   Tries to shut off TV at least half hour before bed and that includes his phone.   Does have some anxiety- admits he tends to over think sometimes but has learned how to better control it.   Occasionally uses pre workout and creatine for body building.   Was prescribed trazodone a few years ago but did not really take it for that long because he felt tipsy in the mornings.         Warts - fingers, both hands  WART(S)  Onset: couple of years     Description:   Location: bilateral hands   Number of warts:   Painful: no    Accompanying Signs & Symptoms:  Signs of infection: no    History:   History of trauma: no  Prior warts: YES    Therapies Tried and outcome: liquid nitrogen          Patient Active Problem List   Diagnosis     Pes planus     Elbow fracture, left     Major depression in partial remission (H)     Allergic rhinitis due to other allergen (ALLERGY)     Warts     Common wart     GONZALEZ (generalized anxiety disorder)     Past Surgical History:   Procedure Laterality Date      "ELBOW SURGERY  1/2011    Left elbow fx- ORIF.        Social History     Tobacco Use     Smoking status: Never Smoker     Smokeless tobacco: Never Used   Substance Use Topics     Alcohol use: Yes     Alcohol/week: 0.0 oz     Comment: occ     Family History   Problem Relation Age of Onset     Myocardial Infarction Paternal Grandfather            Reviewed and updated as needed this visit by Provider         Review of Systems   ROS COMP: Constitutional, msk, psych, endo  systems are negative, except as otherwise noted.      Objective    /72 (BP Location: Right arm, Patient Position: Chair, Cuff Size: Adult Regular)   Pulse 73   Temp 98  F (36.7  C) (Oral)   Resp 16   Ht 1.778 m (5' 10\")   Wt 84.8 kg (187 lb)   BMI 26.83 kg/m    Body mass index is 26.83 kg/m .  Physical Exam   GENERAL: healthy, alert and no distress  SKIN: warts- Left middle finger ; Right pinky and index finger   PSYCH: mentation appears normal, affect normal/bright    Diagnostic Test Results:  Labs reviewed in Epic  none         Assessment & Plan     1. Elevated TSH  - reviewed labs via care everywhere. TSH 4.58; T4 0.87. Advised patient this is not enough to start on levothyroxine. Will recheck labs and proceed accordingly.   - TSH with free T4 reflex    2. Insomnia, unspecified type  - will start on trial of trazodone. Can start at 25 mg and go up to 50 mg if needed. .Medication use and side effects discussed with the patient. Patient is in complete understanding and agreement with plan. Patient interested in sleep psychology- referral placed.   - SLEEP PSYCHOLOGY REFERRAL  - traZODone (DESYREL) 50 MG tablet; Take 0.5-1 tablets (25-50 mg) by mouth At Bedtime  Dispense: 60 tablet; Refill: 0    3. Common wart  - verbal consent obtained. cleansed with alcohol, pared with #15 blade, liquid nitrogen applied x 3, bandaid placed.  Explained that may take more than 1 treatment, keep covered for next 24 hours, if wart remains return in 3-4 weeks " for an additional treatment.  - DESTRUCT BENIGN LESION, UP TO 14          See Patient Instructions    Return in about 4 weeks (around 7/24/2019) for medication recheck, wart recheck .    Zoe Schneider MD  [facility]    Answers for HPI/ROS submitted by the patient on 6/26/2019   Chronic problems general questions HPI Form  If you checked off any problems, how difficult have these problems made it for you to do your work, take care of things at home, or get along with other people?: Not difficult at all  PHQ9 TOTAL SCORE: 5  GONZALEZ 7 TOTAL SCORE: 2

## 2019-06-27 LAB — TSH SERPL DL<=0.005 MIU/L-ACNC: 2.6 MU/L (ref 0.4–4)

## 2019-06-27 ASSESSMENT — PATIENT HEALTH QUESTIONNAIRE - PHQ9: SUM OF ALL RESPONSES TO PHQ QUESTIONS 1-9: 5

## 2019-06-27 ASSESSMENT — ANXIETY QUESTIONNAIRES: GAD7 TOTAL SCORE: 2

## 2019-07-24 ENCOUNTER — OFFICE VISIT (OUTPATIENT)
Dept: FAMILY MEDICINE | Facility: CLINIC | Age: 24
End: 2019-07-24
Payer: COMMERCIAL

## 2019-07-24 VITALS
HEART RATE: 70 BPM | TEMPERATURE: 98.2 F | RESPIRATION RATE: 14 BRPM | WEIGHT: 182 LBS | SYSTOLIC BLOOD PRESSURE: 106 MMHG | OXYGEN SATURATION: 97 % | DIASTOLIC BLOOD PRESSURE: 60 MMHG | BODY MASS INDEX: 26.11 KG/M2

## 2019-07-24 DIAGNOSIS — F51.01 PRIMARY INSOMNIA: ICD-10-CM

## 2019-07-24 DIAGNOSIS — B07.8 COMMON WART: Primary | ICD-10-CM

## 2019-07-24 PROCEDURE — 17110 DESTRUCTION B9 LES UP TO 14: CPT | Performed by: FAMILY MEDICINE

## 2019-07-24 PROCEDURE — 99213 OFFICE O/P EST LOW 20 MIN: CPT | Mod: 25 | Performed by: FAMILY MEDICINE

## 2019-07-24 NOTE — PROGRESS NOTES
Subjective     Alex Mensah is a 23 year old male who presents to clinic today for the following health issues:    HPI   WART(S)      Onset: years    Description (location/number): hands    Accompanying signs and symptoms: Painful: no     History: prior warts: YES    Therapies tried and outcome: liquid nitrogen    Insomnia      Duration: December 2018    Description  Frequency of insomnia:  nightly  Time to fall asleep: 30 minutes  Middle of night awakening:  nightly  Early morning awakening:  When he uses trazodone.    Accompanying signs and symptoms:  none    History  Similar episodes in past:  YES  Previous evaluation/sleep study:  no     Precipitating or alleviating factors:  New stressful situation: no   Caffeine intake after lunchtime: no   OTC decongestants: no   Any new medications: YES    Therapies tried and outcome: trazodone 50 mg daily.      Patient Active Problem List   Diagnosis     Pes planus     Elbow fracture, left     Major depression in partial remission (H)     Allergic rhinitis due to other allergen (ALLERGY)     Warts     Common wart     GONZALEZ (generalized anxiety disorder)     Past Surgical History:   Procedure Laterality Date     ELBOW SURGERY  1/2011    Left elbow fx- [facility]IF.        Social History     Tobacco Use     Smoking status: Never Smoker     Smokeless tobacco: Never Used   Substance Use Topics     Alcohol use: Yes     Alcohol/week: 0.0 oz     Comment: occ     Family History   Problem Relation Age of Onset     Myocardial Infarction Paternal Grandfather          Current Outpatient Medications   Medication Sig Dispense Refill     fluticasone (FLONASE) 50 MCG/ACT spray Spray 1-2 sprays into both nostrils daily 1 Bottle 11     sulfamethoxazole-trimethoprim (BACTRIM DS/SEPTRA DS) 800-160 MG per tablet Take 1 tablet by mouth 2 times daily 20 tablet 0     traZODone (DESYREL) 50 MG tablet Take 0.5-1 tablets (25-50 mg) by mouth At Bedtime 60 tablet 0     No Known Allergies  Recent Labs   Lab  "Test 06/26/19  1105 07/24/15  1408   CR  --  0.88   GFRESTIMATED  --  >90  Non  GFR Calc     GFRESTBLACK  --  >90   GFR Calc     POTASSIUM  --  3.9   TSH 2.60 1.28          Reviewed and updated as needed this visit by Provider         Review of Systems   ROS COMP: CONSTITUTIONAL: NEGATIVE for fever, chills, change in weight  RESP: NEGATIVE for significant cough or SOB  CV: NEGATIVE for chest pain, palpitations or peripheral edema      Objective    Wt 82.6 kg (182 lb)   BMI 26.11 kg/m    Body mass index is 26.11 kg/m .  Physical Exam   GENERAL: healthy, alert and no distress  SKIN: multiple 1 to 5 mm  wart - fingers bilaterally.  PSYCH: mentation appears normal, affect normal/bright            Assessment & Plan     1. Common wart  The warts were treated with liquid nitrogen for 20 to 40 seconds each, explained the risks of the procedure to the patient/parent, agreed with the procedure, lesion may form a blister, or crust over, there is possibility of hyper/hypo pigmentation after resolution.  Follow up in 3 weeks if the lesion did not go away.      2. Primary insomnia  Continue on trazodone, may increase to 100 mg daily. Follow up with sleep specialist.       BMI:   Estimated body mass index is 26.11 kg/m  as calculated from the following:    Height as of 6/26/19: 1.778 m (5' 10\").    Weight as of this encounter: 82.6 kg (182 lb).           Follow up as above.    No follow-ups on file.    Deepthi Patel MD  [facility] [place]      "

## 2019-08-09 ENCOUNTER — OFFICE VISIT (OUTPATIENT)
Dept: SLEEP MEDICINE | Facility: CLINIC | Age: 24
End: 2019-08-09
Payer: COMMERCIAL

## 2019-08-09 VITALS
DIASTOLIC BLOOD PRESSURE: 74 MMHG | HEART RATE: 51 BPM | SYSTOLIC BLOOD PRESSURE: 126 MMHG | HEIGHT: 70 IN | BODY MASS INDEX: 25.77 KG/M2 | OXYGEN SATURATION: 100 % | WEIGHT: 180 LBS

## 2019-08-09 DIAGNOSIS — F51.04 CHRONIC INSOMNIA: Primary | ICD-10-CM

## 2019-08-09 PROCEDURE — 90791 PSYCH DIAGNOSTIC EVALUATION: CPT | Performed by: PSYCHOLOGIST

## 2019-08-09 ASSESSMENT — MIFFLIN-ST. JEOR: SCORE: 1817.72

## 2019-08-09 NOTE — PATIENT INSTRUCTIONS
Cognitive Behavioral Therapy for Insomnia (CBT-I)    Most people have trouble sleeping at some point in their life.   Chronic insomnia means you have had trouble falling asleep and/or staying asleep for at least the past three months.  Despite allowing enough time for sleep, it is affecting how you feel. You are not alone.  It is estimated that 10-15% of adults experience chronic insomnia.     Good News    The good news is that [facility] offers a highly effective treatment for those struggling with chronic insomnia called Cognitive Behavioral Therapy for Insomnia (CBT-I for short).  CBT-I is a scientifically proven non-drug therapy recommended by the American College of Physicians as the first-choice treatment for insomnia.   How CBT-I Works    CBT-I targets factors that lead to long-term insomnia:    ? Conditioned arousal    When you lay awake in bed over many nights, your body actually becomes trained or  conditioned  to be awake during the night.  It makes the bed associated with alertness instead of sleepiness.    ? Habits that weaken your body s sleep drive and sleep clock    Changing sleep schedules, extended time in bed, using mobile devices and computers close to bedtime, and naps too late in the day can harm your sleep at night.    ? Sleep-related worry and other sources of arousal    Things such as worrying about sleep, stress, drinking too much caffeine, and not winding down before bed can interfere with your body s natural sleep drive.    How soon will it work?    Individuals often see improvement in their sleep within 2-3 weeks of beginning sleep consolidation and stimulus control techniques.  Unlike sleeping pills that are quick acting but lose effective ness, CBT-I takes more time and effort but research shows the effects are more likely to be long lasting.     Are there side effects?    CBT-I is the first choice and safest treatment for insomnia. One effect of this treatment early on can be daytime  sleepiness. The treatment consolidates nighttime sleep in part by reducing your time in bed and avoiding napping.     [facility]    The [facility]BT[facility] program is delivered by sleep specialists within our [facility] and trained sleep health coaches located within [facility] clinics.   We provide training through a combination of office visits, phone visits, CBT-i  mobile application, and tapvivahart communication.    [facility] C[facility] is a four-part training program that teaches you the skills needed for a better night s sleep. It involves:      1.  Understanding Sleep and Insomnia  2.  Sleep Consolidation Training           3.  Developing Healthy Thoughts about Sleep           4.  Relaxation and Sleep    Understanding Sleep and Insomnia    Before you start sleep training, it is important to know a bit about sleep and insomnia.  This section discusses some of the science behind the behavioral changes involved in CBT-I including:     ? The basics of sleep  ? How insomnia develops  ? How to track your sleep  ? CBT-I and sleeping pills    The Basics of Sleep    Sleep like food and water is something we need every day. The purpose of sleep is still not exactly clear, but sleep experts agree we need consistent quality sleep to function at our best. There is evidence that sleep helps regulate our body s metabolism, repair damaged cells, and maintain brain function. Sleep is actually a very active part of life. Sleep occurs in four stages that cycle every  minutes throughout the night between REM (rapid eye movement) sleep stage and three Non-REM sleep stages (N1, N2 and N3)  We usually get most of our deepest stage N3 sleep during the first few hours of sleep.  REM sleep is where most of our dreaming occurs.  We usually get the bulk of our REM sleep during the last half of our sleep.     Sleepiness vs. Tiredness    Sleepiness and tiredness are different. Signs of sleepiness  are dropping eyelids, yawning and inadvertent head nodding. Tiredness is the experience of a loss of energy or strength.  It may be due to many factors including exertion, stress and some medications and medical conditions.    How much sleep do we need?    Sleep needs vary from person to person. Most adults need between 6-8 hours of sleep.  Sleeping too little or too much may be a health risk.  As we age, most people report their sleep gets lighter, earlier, shorter, and more restless.     What regulates sleep?    The two processes that regulate your sleep are your body's biological Sleep Drive and 24-hour Circadian Clock.  Together these make us feel alert during the day and promote sleep at night.    Your sleep drive is lowest when you first wake up.  Sleep drive gradually increases as the day goes on and depends on how long you have been awake.  The longer time you are awake the easier it is to fall asleep.  Sleeping gradually reduces your sleep drive. That is why napping in the evening or close to bed can make it harder to sleep at night. Your biological clock promotes wakefulness during the day and sleep at night.      Figure 1 two process sleep model (source: 1. Kiara PERLA, Ale R, Prasad T, et al. Future research directions in sleep and ADHD: report of a consensus working group. J Atten Disord. 2013;17(7):550-564. doi:10.1177/3229081715755541)    Arousal and Sleep    Feeling anxious or tense can make sleep more difficult by masking the strength of your sleep drive. Your brain s arousal system not only triggers insomnia, it plays a role in maintaining it as well.  Common sources of arousal include:    ? Worry about sleep  ? An active mind concerned about unfinished tasks  ? Concern over family, finances or work  ? Worry about things beyond your control.     Understanding Insomnia    Some people have a greater likelihood of developing insomnia due to genetics, personality or age. Short-term insomnia lasting a few  nights or weeks is normal, particularly during stressful events.  It can be caused by other things like jet lag, working a different shift, medication, or the onset of a medical or mental health condition.    Short-term insomnia can become chronic when you begin to fearful, worried and  on guard  about sleep loss. As you spend more time in bed or try forcing yourself to sleep your bed becomes linked with wakefulness.  This is why people with insomnia commonly report feeling tired before bed but suddenly more alert when getting into bed.  This type of  conditioning  maintains the insomnia even when the triggering event or condition is resolved.    A Model of Chronic Insomnia        Tracking your Sleep    Sleep tracking is an essential part of training yourself to sleep better and monitoring your progress.  There are several ways to keep track of your sleep habits.    [facility] Sleep Diary    This paper sleep diary is easy to use and includes an example of how to use it.  Complete your sleep diary every morning after you get up for the day. Do not watch the clock at night.  Your daily diary entry is your recollection of the past 24 hours.        CBT-i      CBT-i  is a convenient way to track your sleep on your mobile phone. Once you have downloaded the free jennifer simply go to My Sleep > Sleep Diary > Add New Entry and record your entry for the day. The jennifer graphs your information and has helpful reminders. If you go to Settings >[place] User Data you can e-mail a file of your sleep diary information to yourself and give a copy to your provider.     CBT-i  contains a toolkit at your fingertips of skills you will be learning as part of the [facility]BT-I program.         Wearable Sleep Tracking Devices    If you choose not to use a paper or mobile sleep diary during CBT-I, a wearable device is an option.  There are many sleep tracking devices that estimate the timing and quantity of sleep by measuring body  movement.  Though many of these devices claim to measure the depth or stages of sleep, these data are not accurate and not used as part of CBT-I treatment.    CBT-I and Sleeping Pills    Sleep medication can be helpful in the short-term but often stop working in the long-term.  Sleeping pills treat symptoms and not the underlying cause of insomnia.  When people do try to get off sleeping pills they often do so abruptly.  This can cause temporary rebound insomnia and lead to increased distress about sleeplessness.  This in turn strengthens the belief that pills are necessary for sleep.    Many patients choose to discontinue sleeping pills prior to beginning CBT-I. Some gradually reduce or discontinue sleep medication as they implement behavior changes.  Our program does not prescribe or medically manage use and tapering of sleep medication during CBT-I.     You should first talk to your prescribing provider before tapering or discontinuing sleep medication and determine with them what the best course is for you.      Sleep Consolidation Training Plan    You are now ready to start the process of Sleep Consolidation Training.  Studies show these six strategies are the most potent tools to achieving better sleep.    1.  Reduce your  Time In Bed    Spending extra time in bed in an effort to get more sleep actually can cause more sleep disruption and reduce sleep efficiency. Sleep efficiency is simply the percentage of time a person spends asleep while in bed. Normal sleep efficiency is thought to be 85% or greater.  By reducing your time in bed, you will be awake longer leading to quicker and deeper sleep. This strategy does not reduce the amount of sleep you get, just the amount of time you are awake in bed.                                       Your Sleep Schedule Prescription    In this first step of sleep consolidation training, you will reduce your time in bed by following your Sleep Schedule Prescription. Your  sleep schedule prescription is determined by the average nightly amount of sleep you got over the past two weeks plus 30 minutes. The least amount of time prescribed is 6 hours in bed unless a qualified sleep specialist recommends otherwise.     Total Time in Bed:  8 hours  Bedtime:  No earlier than 9 PM  Wake-up Time:  Out of bed every day by 5 AM          Changing Your Sleep Schedule Prescription    After one week, you can adjust your initial sleep schedule prescription based on        how well you are sleeping. This depends on how quickly your sleep has consolidated.  You should change your prescribed time in bed based ONLY on information from your Sleep Diary, CBT-i  jennifer or Wearable Device. It is important you follow these guidelines:    ? Increase Time in Bed by 15 Minutes IF over the past week:    o You are falling asleep in less than 30 minutes AND awake less than 30 minutes during the night.     o OR your CBT-I  jennifer or Wearable Device indicates your sleep efficiency has been 90% or greater.    ? Decrease Time in Bed by 15 Minutes IF over the past week:    o It is taking longer than 30 minutes to fall asleep OR you are awake more than 30 minutes during the night.      o OR your CBT-I  jennifer or Wearable Device indicates your sleep efficiency has been less than 80%.    o DO NOT decrease your sleep schedule below 6 hours unless instructed by your provider.    2.  Don t go to bed until you feel sleepy (not tired or fatigued)     If you reach your prescribed bedtime and are not sleepy wait until you feel ready for sleep. This also helps increase your sleep drive.  A common strategy to cope with insomnia is to spend extra time in bed in the hopes of getting more sleep.  This can actually lead to further disrupted sleep leaving you frustrated at night feeling worse in the morning    If you feel sleepy before your prescribed bedtime, find activities that can help you stay awake until it is time for you  to go to bed. Even brief naps in the evening can be very disruptive of sleep at night.     3.  Don t stay in bed unless you are sleeping      Never stay in bed awake for extended periods.  Long periods awake in bed usually lead to restlessness, frustration, or worry about not sleeping.  These reactions make it harder to fall asleep and are part of what trains you to be awake at night.      If you are unable to fall asleep or return to sleep in within 15-20 minutes, get up and go to another room and do something relaxing. Plan things you can do when you get out of bed. Avoid use of mobile devices or computers.  Return to bed only when you feel sleepy again.  Repeat as often as needed throughout the night.     4.  Get out of bed at the same time every day    Getting up at the same time helps set your biological sleep clock. It is important to stick to your wake time no matter how much sleep you got the night before or how you feel in the morning.    Varying your wake time can have the same effect as jet lag.  It  disrupts your biological sleep clock and makes you feel more tired  and exhausted. Trying to  catch up  on sleep on the weekends  only makes things worse setting yourself up for a cycle of bad  nights of sleep during the week.           Make sure you set an alarm and keep it away from the bed to prevent looking at the clock at night.  If you do wake up before your alarm, don t conclude you should just start your day.  Rather, get out of bed, go to another room, and relax.  You might feel sleepy again and get a bit more sleep before the alarm rings.     5.  Use the bed only for sleep and sex    Do not read, eat, worry, think about sleep, use mobile phones or tablets, or watch TV in bed. Do not watch the clock during the night. These activities weaken your brain s association between the bed and sleep. They increase alertness and make sleep less likely. Avoiding these behaviors reduces frustration and trains  your brain that bed = sleep.    6.  Avoid daytime napping    Avoid daytime napping if possible. Napping partially fulfills your need for sleep and weakens your sleep drive at night. It may also throw off your body s natural sleep rhythm. This can lead to more nighttime awakenings and sleep disruption.    However, if you find yourself sleepy (not just tired) you can take a brief 15-30 minute nap during the day if needed.  Naps taken within 7-8 hours of your prescribed wake time are less likely to affect your sleep the coming night.  Sleepy people make more mistakes and may hurt themselves or others if driving or operating equipment.  Therefore, safety trumps all other sleep prescriptions.  Never drive if drowsy or sleepy.     Changing Sleep Habits is Challenging     Research shows that making significant changes in sleep habits improves not only our sleep quality but also how we feel. For most, change is challenging and can be stressful.  This is especially true if old habits - like spending more time in bed -- were a way to avoid anxiety and worry about getting enough sleep.  Patience and consistency are key.    For CBT-i  Users    Go toTools > Create New Sleep Habits for more information about sleep habits you will be adopting as part of you CBTI program.

## 2019-08-09 NOTE — PROGRESS NOTES
SLEEP MEDICINE CONSULTATION  Sleep Psychology    Name: Alex Mensah MRN# [id number]   Age: 23 year old YOB: 1995     Date of Consultation: Aug 9, 2019  Consultation is requested by: No referring provider defined for this encounter.  Primary care provider: Paul Bravo    Reason for Sleep Consultation     Alex Mensah is a 23 year old male seen today for a behavioral sleep medicine consultation because of insomnia.      Assessment and Plan     Sleep Diagnoses/Recommendations:    1. Chronic insomnia  Patient history of insomnia suggests largely psychophysiologic factors contributing to onset and maintenance of symptoms.  Significantly, there is a history of depression and anxiety, now largely in remission.  It is likely that situational factors, even change in sleep schedule may have triggered the onset of insomnia in the setting of elevated risk for insomnia given history of depression/anxiety.  Patient is a very suitable candidate for cognitive behavioral therapy for insomnia.  Patient is currently using trazodone and has a increased his dose from 25 mg to 100 mg a overtime.  Targets for behavioral treatment include sleep curtailment.  Patient is spending excessive time in bed up to 10.5-11 hours.  He agreed to reduce time in bed to 8 hours and maintain a consistent sleep window over the next 2 weeks of 11-7 AM.  Once he gets to school he will adjust this sleep schedule II 9 PM-5 AM to allow for morning classes and morning routines.  We discussed implementing stimulus control.  Overall other sleep hygiene appears to be good with minimal use of alcohol and caffeine.  He does use cannabis recreationally but not for insomnia.    This evaluation does not suggest other intrinsic sleep disorder such as sleep disordered breathing or restless legs syndrome.  There does not appear to be significant circadian rhythm factors affecting sleep.      See patient instructions for initial treatment  "recommendations and behavioral sleep plan.    Summary Counseling:      Alex was provided information about the pathophysiology of insomnia and psychophysiological factors contributing to the onset and maintenance of insomnia associated with history of depression/anxiety.  Treatment option were discussed including component of cognitive-behavioral therapy for insomnia. The benefits and potential early side effects of treatment including increased daytime sleepiness were discussed. She was advised to seek medical advise and consultation around use of or changes to prescription sleep medication, Patient was counseled on the importance of avoiding driving if drowsy.        Follow-up: 4 weeks telemedicine follow-up, patient will keep track of sleep progress through use of CBT-I  application, my chart follow-up as needed.     History of Present Sleep Complaint     Alex Mensah is a 23 year old year old male who reports an 8-month history of difficulty initiating and maintaining sleep.  He reports a prior history of significant depression with anxiety in high school.  There was some sleep disruption associated with this.  Currently he is reporting very good mood and no significant generalized anxiety.    Reviewing 2 weeks of sleep diary data, patient has adjusted his sleep schedule later to account for a job requiring him to work a number of days a week until 10 PM.  He typically gets to bed by 11-11:30 PM he takes his trazodone now at 100 mg at bedtime.  Sleep latency is usually 10-15 minutes.  He wakes up 3-4 times a night but sleeps solidly for at least 4 hours until waking to go to the bathroom.  He states that he is able to fall asleep usually within 30 minutes.  He does not report worry or rumination in the middle the night.  He reports early morning awakenings however and states that he usually \"just lies in bed until its time to get up\".  This may last up to 3 hours.  He estimates that his average total " "sleep time with trazodone is approximately 6.75 hours.    Alex does not report snoring, witnessed apneas or excessive daytime sleepiness.  He does not report gasping choking or snort arousals.  He does not report morning headaches.  There is no history of hypnagogic or hypnopompic hallucinations.  He does not report sudden sleep attacks, sleep paralysis or cataplexy.    Alex denies that restless or uncomfortable legs.  He does not report nightmares, history of sleepwalking or sleep talking.  He does not report bruxism.  The patient does not report dream enactment behavior or confusional arousals.    There is no history of driving while drowsy.    He states that his current sleep environment is quite comfortable and dark.  He does not have a bed partner.  When he returns to school he would be living and staying at his family cabin which will be quiet and comfortable.    Previous Sleep Studies:    No previous sleep studies    .     Substance Use:    Tobacco: None reported   Caffeine: Minimal.   Alcohol: Occasional social use, once a month  Recreational Drugs: Recreational use of cannabis, does not use to promote sleep      Screening          Basin Sleepiness Scale  Total score - Basin: 0 .    PATRIC Total Score: 17       PHQ-9 SCORE 6/26/2019   PHQ-9 Total Score -   PHQ-9 Total Score MyChart 5 (Mild depression)   PHQ-9 Total Score 5       GONZALEZ-7 SCORE 12/3/2016 4/13/2018 6/26/2019   Total Score - - -   Total Score - - 2 (minimal anxiety)   Total Score 15 2 2       Patient Activation Score     JOSE Score (Last Two) 6/12/2018 6/26/2019   JOSE Raw Score 39 40   Activation Score 90.2 100   JOSE Level 4 4         Vitals     /74   Pulse 51   Ht 1.778 m (5' 10\")   Wt 81.6 kg (180 lb)   SpO2 100%   BMI 25.83 kg/m       Medical History     Patient Active Problem List   Diagnosis     Pes planus     Elbow fracture, left     Major depression in partial remission (H)     Allergic rhinitis due to other allergen " (ALLERGY)     Common wart     GONZALEZ (generalized anxiety disorder)        Current Outpatient Medications   Medication Sig Dispense Refill     fluticasone (FLONASE) 50 MCG/ACT spray Spray 1-2 sprays into both nostrils daily 1 Bottle 11     traZODone (DESYREL) 50 MG tablet Take 0.5-1 tablets (25-50 mg) by mouth At Bedtime 60 tablet 0       Past Surgical History:   Procedure Laterality Date     ELBOW SURGERY  1/2011    Left elbow fx- ORIF.         No Known Allergies      Psychiatric History     Prior Psychiatric Diagnoses: yes, reported history of major depressive disorder and generalized anxiety, both appear to be well controlled currently   Psychiatric Hospitalizations: none   Use of Psychotropics none currently other than trazodone for insomnia      Chemical Use     Prior Chemical Dependency Treatment: none         Family History     Family History   Problem Relation Age of Onset     Myocardial Infarction Paternal Grandfather        Sleep Disorders: None reported    Social History     Social History     Socioeconomic History     Marital status: Single     Spouse name: None     Number of children: None     Years of education: None     Highest education level: None   Occupational History     None   Social Needs     Financial resource strain: None     Food insecurity:     Worry: None     Inability: None     Transportation needs:     Medical: None     Non-medical: None   Tobacco Use     Smoking status: Current Some Day Smoker     Smokeless tobacco: Never Used     Tobacco comment: vape   Substance and Sexual Activity     Alcohol use: Yes     Alcohol/week: 0.0 oz     Comment: occ     Drug use: Yes     Types: Marijuana     Comment: occ     Sexual activity: Never   Lifestyle     Physical activity:     Days per week: None     Minutes per session: None     Stress: None   Relationships     Social connections:     Talks on phone: None     Gets together: None     Attends Yarsani service: None     Active member of club or  organization: None     Attends meetings of clubs or organizations: None     Relationship status: None     Intimate partner violence:     Fear of current or ex partner: None     Emotionally abused: None     Physically abused: None     Forced sexual activity: None   Other Topics Concern     Parent/sibling w/ CABG, MI or angioplasty before 65F 55M? No   Social History Narrative     None       Single, senior in college, studying elementary education     Mental Status Examination     Alex presented as appropriately dressed and groomed and was oriented X3 with speech language intact.  The patient was cooperative throughout the evaluation with no signs of hallucinations, delusions, loosening of associations or other thought disturbance.  Mood was normal.  Affect was congruent with mood. Insight and judgement we intact.  Memory was intact for recent and remote elements.  There was no report of suicidal ideation, intention or plan. Attention and concentration were within normal.      Time Spent: 50 minutes    Copy:   Paul Bravo               No referring provider defined for this encounter.    Bienvenido Velez PsyD, LP, DBSM  Diplomate, Behavioral Sleep Medicine  [facility] -  [place] [facility] [place]

## 2019-08-09 NOTE — NURSING NOTE
"Chief Complaint   Patient presents with     Sleep Problem     consult-insomnia       Initial /74   Pulse 51   Ht 1.778 m (5' 10\")   Wt 81.6 kg (180 lb)   SpO2 100%   BMI 25.83 kg/m   Estimated body mass index is 25.83 kg/m  as calculated from the following:    Height as of this encounter: 1.778 m (5' 10\").    Weight as of this encounter: 81.6 kg (180 lb).    Medication Reconciliation: complete    Neck circumference: 16 inches / 41 centimeters.        "

## 2019-09-09 DIAGNOSIS — G47.00 INSOMNIA, UNSPECIFIED TYPE: ICD-10-CM

## 2019-09-10 NOTE — TELEPHONE ENCOUNTER
Routing refill request to provider for review/approval because:  Please review- per last OV note provider mentioned may increase to 100 mg and follow up with a sleep specialist    Fani Smyth, RN, BSN

## 2019-09-11 RX ORDER — TRAZODONE HYDROCHLORIDE 50 MG/1
TABLET, FILM COATED ORAL
Status: SHIPPED
Start: 2019-09-11 | End: 2021-09-29

## 2019-09-11 NOTE — TELEPHONE ENCOUNTER
Denied, send to SLEEP PROVIDER, LICHA AVILA  Sent pharmacy note informing  Rachel Walker RN, BSN  Message handled by Nurse Triage.

## 2019-10-11 ENCOUNTER — VIRTUAL VISIT (OUTPATIENT)
Dept: SLEEP MEDICINE | Facility: CLINIC | Age: 24
End: 2019-10-11
Payer: COMMERCIAL

## 2019-10-11 DIAGNOSIS — F51.04 CHRONIC INSOMNIA: Primary | ICD-10-CM

## 2019-10-11 PROCEDURE — 98967 PH1 ASSMT&MGMT NQHP 11-20: CPT | Performed by: PSYCHOLOGIST

## 2019-10-11 NOTE — PROGRESS NOTES
"Alex Mensah is a 23 year old male who is being evaluated via a billable telephone visit.      The patient has been notified of following:     \"This telephone visit will be conducted via a call between you and your physician/provider. We have found that certain health care needs can be provided without the need for a physical exam.  This service lets us provide the care you need with a short phone conversation.  If a prescription is necessary we can send it directly to your pharmacy.  If lab work is needed we can place an order for that and you can then stop by our lab to have the test done at a later time.    If during the course of the call the physician/provider feels a telephone visit is not appropriate, you will not be charged for this service.\"     Consent has been obtained for this service by 1 care team member: yes. See the scanned image in the medical record.    Alex Mensah complains of  No chief complaint on file.      I have reviewed and updated the patient's Past Medical History, Social History, Family History and Medication List.    ALLERGIES  Patient has no known allergies.    Karen Maier CMA   (MA signature)    SLEEP MEDICINE TELEPHONE VISIT  Sleep Psychology    Patient Name: Alex Mensah  MRN:  [id number]  Date of Service: Oct 11, 2019       Subjective Report     Alex Mensah  returns for a telephone visit to discuss progress in implementing behavioral strategies for the management of chroni insomnia.  Alex reports he had been doing better with insomnia using behavioral techniques and combining with 10 mg traodone.  He did not have the medication for two weeks and feels his sleep has deteriorated somewhat.    Discussed further sleep compression to  Hours based on his self recorded sleep diaries.  DIscussed rationale of sleep restriction and risks of increased sleepiness.  Avoiding driving while sleeping or if sleepy emphasized.     He will first eliminate use of caffeine for a " sleep experiment.  If he does not notice improvement then further sleep restriction..     .     Sleep Data:     Source of Data:  Sleep diaries    Sleep Latency: 10 min.  Times Awakened: 2  Wake Time After Sleep Onset: 70 min.  Total Sleep Time:  5 hours 0 min.  Sleep Efficiency: 79%      .            Interventions     Strategies and recommendations including sleep restriction and stimulus control were discussed today.       Vital Signs     There were no vitals taken for this visit.     Mental Status     ASpeech/Language:  Normal  Thought Process: Intact  Associations:  Normal  Thought Content: Normal    Diagnostic Impressions and Plan       Chronic insomnia  F51.04      Follow-up: 4 weeks    Time Spent: 17 minutes      Bienvenido Velez PsyD, LP, DBSM  Diplomate, Behavioral Sleep Medicine  [facility] - [place] and [place]

## 2019-11-15 ENCOUNTER — VIRTUAL VISIT (OUTPATIENT)
Dept: SLEEP MEDICINE | Facility: CLINIC | Age: 24
End: 2019-11-15
Payer: COMMERCIAL

## 2019-11-15 DIAGNOSIS — F51.04 CHRONIC INSOMNIA: Primary | ICD-10-CM

## 2019-11-15 PROCEDURE — 98966 PH1 ASSMT&MGMT NQHP 5-10: CPT | Performed by: PSYCHOLOGIST

## 2019-11-15 NOTE — PROGRESS NOTES
"Alex Mensah is a 24 year old male who is being evaluated via a billable telephone visit.      The patient has been notified of following:     \"This telephone visit will be conducted via a call between you and your physician/provider. We have found that certain health care needs can be provided without the need for a physical exam.  This service lets us provide the care you need with a short phone conversation.  If a prescription is necessary we can send it directly to your pharmacy.  If lab work is needed we can place an order for that and you can then stop by our lab to have the test done at a later time.    If during the course of the call the physician/provider feels a telephone visit is not appropriate, you will not be charged for this service.\"     Consent has been obtained for this service by 1 care team member: yes. See the scanned image in the medical record.    Alex Mensah complains of  No chief complaint on file.      I have reviewed and updated the patient's Past Medical History, Social History, Family History and Medication List.    ALLERGIES  Patient has no known allergies.    Arlette Bianchi MA on 11/15/2019 at 11:58 AM       SLEEP MEDICINE TELEPHONE VISIT  Sleep Psychology    Patient Name: Alex Mensah  MRN:  [id number]  Date of Service: Nov 15, 2019       Subjective Report     Alex Mensah  returns for a telephone visit to discuss progress in implementing behavioral strategies for the management of chronic insomnia.  Alex reports that behavioral methods work and that initially he had improved sleep consolidation.  He notices that if he falls off his schedule or does not keep to the stimulus control rolls that his sleep deteriorates somewhat.  Patient feels he has the motivation to continue on course.  He reports that overall he feels better..     .     Sleep Data:     Source of Data: Verbal self-report of CBT-I data via CBT-I     Sleep Latency: Less than 30 min.  Times " Awakened: 1  Wake Time After Sleep Onset; less than 30  Total Sleep Time: 6 hours 45 min.  Sleep Efficiency: 90 %      .            Interventions     Strategies and recommendations including maintenance of stimulus control were discussed today.       Vital Signs     There were no vitals taken for this visit.     Mental Status     ASpeech/Language:  Normal  Thought Process: Intact  Associations:  Normal  Thought Content: Normal    Diagnostic Impressions and Plan       F 51.04 chronic insomnia.      Follow-up: 6 weeks by phone visit, or my chart as needed    Time Spent: 10 minutes      Bienvenido Velez PsyD, LP, DBSM  Diplomate, Behavioral Sleep Medicine  [facility]

## 2019-12-09 ENCOUNTER — HEALTH MAINTENANCE LETTER (OUTPATIENT)
Age: 24
End: 2019-12-09

## 2021-01-15 ENCOUNTER — HEALTH MAINTENANCE LETTER (OUTPATIENT)
Age: 26
End: 2021-01-15

## 2021-09-29 ENCOUNTER — OFFICE VISIT (OUTPATIENT)
Dept: FAMILY MEDICINE | Facility: CLINIC | Age: 26
End: 2021-09-29
Payer: COMMERCIAL

## 2021-09-29 VITALS
TEMPERATURE: 98.2 F | HEART RATE: 67 BPM | OXYGEN SATURATION: 99 % | DIASTOLIC BLOOD PRESSURE: 86 MMHG | WEIGHT: 178 LBS | BODY MASS INDEX: 25.48 KG/M2 | SYSTOLIC BLOOD PRESSURE: 126 MMHG | HEIGHT: 70 IN | RESPIRATION RATE: 18 BRPM

## 2021-09-29 DIAGNOSIS — Z11.59 ENCOUNTER FOR HEPATITIS C SCREENING TEST FOR LOW RISK PATIENT: ICD-10-CM

## 2021-09-29 DIAGNOSIS — Z86.39 HISTORY OF UNDERACTIVE THYROID: ICD-10-CM

## 2021-09-29 DIAGNOSIS — Z83.79 FAMILY HISTORY OF LIVER DISEASE: ICD-10-CM

## 2021-09-29 DIAGNOSIS — Z72.0 TOBACCO USE: ICD-10-CM

## 2021-09-29 DIAGNOSIS — Z11.3 ROUTINE SCREENING FOR STI (SEXUALLY TRANSMITTED INFECTION): ICD-10-CM

## 2021-09-29 DIAGNOSIS — Z23 NEED FOR PNEUMOCOCCAL VACCINE: ICD-10-CM

## 2021-09-29 DIAGNOSIS — Z23 NEED FOR PROPHYLACTIC VACCINATION AND INOCULATION AGAINST INFLUENZA: ICD-10-CM

## 2021-09-29 DIAGNOSIS — F12.90 MARIJUANA USE: ICD-10-CM

## 2021-09-29 DIAGNOSIS — Z11.4 ENCOUNTER FOR SCREENING FOR HIV: ICD-10-CM

## 2021-09-29 DIAGNOSIS — Z00.00 ROUTINE GENERAL MEDICAL EXAMINATION AT A HEALTH CARE FACILITY: Primary | ICD-10-CM

## 2021-09-29 LAB
ALBUMIN SERPL-MCNC: 4 G/DL (ref 3.4–5)
ALP SERPL-CCNC: 58 U/L (ref 40–150)
ALT SERPL W P-5'-P-CCNC: 36 U/L (ref 0–70)
ANION GAP SERPL CALCULATED.3IONS-SCNC: 2 MMOL/L (ref 3–14)
AST SERPL W P-5'-P-CCNC: 19 U/L (ref 0–45)
BILIRUB SERPL-MCNC: 0.6 MG/DL (ref 0.2–1.3)
BUN SERPL-MCNC: 15 MG/DL (ref 7–30)
CALCIUM SERPL-MCNC: 8.7 MG/DL (ref 8.5–10.1)
CHLORIDE BLD-SCNC: 111 MMOL/L (ref 94–109)
CO2 SERPL-SCNC: 27 MMOL/L (ref 20–32)
CREAT SERPL-MCNC: 0.93 MG/DL (ref 0.66–1.25)
GFR SERPL CREATININE-BSD FRML MDRD: >90 ML/MIN/1.73M2
GLUCOSE BLD-MCNC: 92 MG/DL (ref 70–99)
HCV AB SERPL QL IA: NONREACTIVE
HIV 1+2 AB+HIV1 P24 AG SERPL QL IA: NONREACTIVE
POTASSIUM BLD-SCNC: 4.1 MMOL/L (ref 3.4–5.3)
PROT SERPL-MCNC: 6.8 G/DL (ref 6.8–8.8)
SODIUM SERPL-SCNC: 140 MMOL/L (ref 133–144)
TSH SERPL DL<=0.005 MIU/L-ACNC: 2.18 MU/L (ref 0.4–4)

## 2021-09-29 PROCEDURE — 84443 ASSAY THYROID STIM HORMONE: CPT | Performed by: NURSE PRACTITIONER

## 2021-09-29 PROCEDURE — 86803 HEPATITIS C AB TEST: CPT | Performed by: NURSE PRACTITIONER

## 2021-09-29 PROCEDURE — 99395 PREV VISIT EST AGE 18-39: CPT | Mod: 25 | Performed by: NURSE PRACTITIONER

## 2021-09-29 PROCEDURE — 36415 COLL VENOUS BLD VENIPUNCTURE: CPT | Performed by: NURSE PRACTITIONER

## 2021-09-29 PROCEDURE — 87491 CHLMYD TRACH DNA AMP PROBE: CPT | Performed by: NURSE PRACTITIONER

## 2021-09-29 PROCEDURE — 87389 HIV-1 AG W/HIV-1&-2 AB AG IA: CPT | Performed by: NURSE PRACTITIONER

## 2021-09-29 PROCEDURE — 90471 IMMUNIZATION ADMIN: CPT | Performed by: NURSE PRACTITIONER

## 2021-09-29 PROCEDURE — 80053 COMPREHEN METABOLIC PANEL: CPT | Performed by: NURSE PRACTITIONER

## 2021-09-29 PROCEDURE — 90732 PPSV23 VACC 2 YRS+ SUBQ/IM: CPT | Performed by: NURSE PRACTITIONER

## 2021-09-29 PROCEDURE — 90472 IMMUNIZATION ADMIN EACH ADD: CPT | Performed by: NURSE PRACTITIONER

## 2021-09-29 PROCEDURE — 87591 N.GONORRHOEAE DNA AMP PROB: CPT | Performed by: NURSE PRACTITIONER

## 2021-09-29 PROCEDURE — 90686 IIV4 VACC NO PRSV 0.5 ML IM: CPT | Performed by: NURSE PRACTITIONER

## 2021-09-29 ASSESSMENT — PATIENT HEALTH QUESTIONNAIRE - PHQ9
SUM OF ALL RESPONSES TO PHQ QUESTIONS 1-9: 2
SUM OF ALL RESPONSES TO PHQ QUESTIONS 1-9: 2
10. IF YOU CHECKED OFF ANY PROBLEMS, HOW DIFFICULT HAVE THESE PROBLEMS MADE IT FOR YOU TO DO YOUR WORK, TAKE CARE OF THINGS AT HOME, OR GET ALONG WITH OTHER PEOPLE: NOT DIFFICULT AT ALL

## 2021-09-29 ASSESSMENT — ENCOUNTER SYMPTOMS
HEARTBURN: 0
PALPITATIONS: 0
WEAKNESS: 0
DYSURIA: 0
HEMATURIA: 0
PARESTHESIAS: 0
HEADACHES: 0
ARTHRALGIAS: 0
MYALGIAS: 0
COUGH: 0
HEMATOCHEZIA: 0
EYE PAIN: 0
SHORTNESS OF BREATH: 0
FREQUENCY: 0
ABDOMINAL PAIN: 0
NERVOUS/ANXIOUS: 0
CONSTIPATION: 0
NAUSEA: 0
JOINT SWELLING: 0
SORE THROAT: 0
FEVER: 0
CHILLS: 0
DIARRHEA: 0
DIZZINESS: 0

## 2021-09-29 ASSESSMENT — ANXIETY QUESTIONNAIRES
8. IF YOU CHECKED OFF ANY PROBLEMS, HOW DIFFICULT HAVE THESE MADE IT FOR YOU TO DO YOUR WORK, TAKE CARE OF THINGS AT HOME, OR GET ALONG WITH OTHER PEOPLE?: NOT DIFFICULT AT ALL
1. FEELING NERVOUS, ANXIOUS, OR ON EDGE: SEVERAL DAYS
GAD7 TOTAL SCORE: 3
4. TROUBLE RELAXING: SEVERAL DAYS
3. WORRYING TOO MUCH ABOUT DIFFERENT THINGS: NOT AT ALL
GAD7 TOTAL SCORE: 3
GAD7 TOTAL SCORE: 3
2. NOT BEING ABLE TO STOP OR CONTROL WORRYING: NOT AT ALL
6. BECOMING EASILY ANNOYED OR IRRITABLE: NOT AT ALL
5. BEING SO RESTLESS THAT IT IS HARD TO SIT STILL: SEVERAL DAYS
7. FEELING AFRAID AS IF SOMETHING AWFUL MIGHT HAPPEN: NOT AT ALL
7. FEELING AFRAID AS IF SOMETHING AWFUL MIGHT HAPPEN: NOT AT ALL

## 2021-09-29 ASSESSMENT — MIFFLIN-ST. JEOR: SCORE: 1798.65

## 2021-09-29 ASSESSMENT — PAIN SCALES - GENERAL: PAINLEVEL: NO PAIN (0)

## 2021-09-29 NOTE — PROGRESS NOTES
SUBJECTIVE:   CC: Alex Mensah is an 25 year old male who presents for preventive health visit.       Patient has been advised of split billing requirements and indicates understanding: Yes  Healthy Habits:     Getting at least 3 servings of Calcium per day:  Yes    Bi-annual eye exam:  NO    Dental care twice a year:  Yes    Sleep apnea or symptoms of sleep apnea:  None    Diet:  Regular (no restrictions)    Frequency of exercise:  4-5 days/week    Duration of exercise:  30-45 minutes    Taking medications regularly:  No    Medication side effects:  None    PHQ-2 Total Score: 0    Additional concerns today:  No    Has stopped use of trazodone and using XR melatonin with good benefits. Has worked with Sleep Clinic in past.     History of chantix use with good benefit. Is occasional smoker working towards cessation at this time. No interventions desired at this time. Weaning marijuana use as well.     Today's PHQ-2 Score:   PHQ-2 ( 1999 Pf[employer]) 9/29/2021   Q1: Little interest or pleasure in doing things 0   Q2: Feeling down, depressed or hopeless 0   PHQ-2 Score 0   Q1: Little interest or pleasure in doing things Not at all   Q2: Feeling down, depressed or hopeless Not at all   PHQ-2 Score 0       Abuse: Current or Past (Physical, Sexual or Emotional) - No  Do you feel safe in your environment? Yes    Have you ever done Advance Care Planning? (For example, a Health Directive, POLST, or a discussion with a medical provider or your loved ones about your wishes):     Social History     Tobacco Use     Smoking status: Light Tobacco Smoker     Packs/day: 0.00     Smokeless tobacco: Never Used     Tobacco comment: vape   Substance Use Topics     Alcohol use: Yes     Alcohol/week: 0.0 standard drinks     Comment: occ         Alcohol Use 9/29/2021   Prescreen: >3 drinks/day or >7 drinks/week? No   Prescreen: >3 drinks/day or >7 drinks/week? -       Reviewed orders with patient.  Reviewed health maintenance and updated  "orders accordingly - Yes  Lab work is in process  Labs reviewed in EPIC         Reviewed and updated as needed this visit by clinical staff  Tobacco  Allergies  Meds  Problems  Med Hx  Surg Hx  Fam Hx          Reviewed and updated as needed this visit by Provider  Tobacco  Allergies  Meds  Problems  Med Hx  Surg Hx  Fam Hx         Past Medical History:   Diagnosis Date     Cellulitis      Common wart 8/22/2016     GONZALEZ (generalized anxiety disorder) 11/25/2016     Thyroid disease       Past Surgical History:   Procedure Laterality Date     ELBOW SURGERY  01/01/2011    Left elbow fx- ORIF.      ELBOW SURGERY Right        Review of Systems   Constitutional: Negative for chills and fever.   HENT: Negative for congestion, ear pain, hearing loss and sore throat.    Eyes: Negative for pain and visual disturbance.   Respiratory: Negative for cough and shortness of breath.    Cardiovascular: Negative for chest pain, palpitations and peripheral edema.   Gastrointestinal: Negative for abdominal pain, constipation, diarrhea, heartburn, hematochezia and nausea.   Genitourinary: Negative for discharge, dysuria, frequency, genital sores, hematuria, impotence and urgency.   Musculoskeletal: Negative for arthralgias, joint swelling and myalgias.   Skin: Negative for rash.   Neurological: Negative for dizziness, weakness, headaches and paresthesias.   Psychiatric/Behavioral: Negative for mood changes. The patient is not nervous/anxious.           OBJECTIVE:   /86 (BP Location: Right arm, Patient Position: Sitting)   Pulse 67   Temp 98.2  F (36.8  C) (Oral)   Resp 18   Ht 1.778 m (5' 10\")   Wt 80.7 kg (178 lb)   SpO2 99%   BMI 25.54 kg/m    Physical Exam  GENERAL: healthy, alert and no distress  EYES: Eyes grossly normal to inspection, PERRL and conjunctivae and sclerae normal  HENT: ear canals and TM's normal, nose and mouth without ulcers or lesions  NECK: no adenopathy, no asymmetry, masses, or scars and " thyroid normal to palpation  RESP: lungs clear to auscultation - no rales, rhonchi or wheezes  CV: regular rate and rhythm, normal S1 S2, no S3 or S4, no murmur, click or rub, no peripheral edema and peripheral pulses strong  ABDOMEN: soft, nontender, no hepatosplenomegaly, no masses and bowel sounds normal   (male): normal male genitalia without lesions or urethral discharge, no hernia  MS: no gross musculoskeletal defects noted, no edema  SKIN: no suspicious lesions or rashes  NEURO: Normal strength and tone, mentation intact and speech normal  PSYCH: mentation appears normal, affect normal/bright    Diagnostic Test Results:  Labs reviewed in Epic    ASSESSMENT/PLAN:   Alex was seen today for physical.    Diagnoses and all orders for this visit:    Routine general medical examination at a health care facility    History of underactive thyroid  -     TSH with free T4 reflex; Future  -     TSH with free T4 reflex  screen    Family history of liver disease  -     Comprehensive metabolic panel (BMP + Alb, Alk Phos, ALT, AST, Total. Bili, TP); Future  -     Comprehensive metabolic panel (BMP + Alb, Alk Phos, ALT, AST, Total. Bili, TP)  screen    Tobacco use  Marijuana use  Working for cessation; declines intervention    Routine screening for STI (sexually transmitted infection)  -     NEISSERIA GONORRHOEA PCR; Future  -     CHLAMYDIA TRACHOMATIS PCR; Future    Encounter for screening for HIV  -     HIV Antigen Antibody Combo; Future  -     HIV Antigen Antibody Combo    Encounter for hepatitis C screening test for low risk patient  -     Hepatitis C Screen Reflex to HCV RNA Quant and Genotype; Future  -     Hepatitis C Screen Reflex to HCV RNA Quant and Genotype    Other orders  -     REVIEW OF HEALTH MAINTENANCE PROTOCOL ORDERS        Patient has been advised of split billing requirements and indicates understanding: Yes  COUNSELING:      Estimated body mass index is 25.54 kg/m  as calculated from the following:     "Height as of this encounter: 1.778 m (5' 10\").    Weight as of this encounter: 80.7 kg (178 lb).    Weight management plan: Discussed healthy diet and exercise guidelines    He reports that he has been smoking. He has been smoking about 0.00 packs per day. He has never used smokeless tobacco.  Tobacco Cessation Action Plan:   working towards cessation; declines intervention      Counseling Resources:  ATP IV Guidelines  Pooled Cohorts Equation Calculator  Breast Cancer Risk Calculator  BRCA-Related Cancer Risk Assessment: FHS-7 Tool  FRAX Risk Assessment  ICSI Preventive Guidelines  Dietary Guidelines for Americans, 2010  [employer]'s MyPlate  ASA Prophylaxis  Lung CA Screening    RICARDO Del Castillo CNP  [facility]  Answers for HPI/ROS submitted by the patient on 9/29/2021  If you checked off any problems, how difficult have these problems made it for you to do your work, take care of things at home, or get along with other people?: Not difficult at all  PHQ9 TOTAL SCORE: 2  GONZALEZ 7 TOTAL SCORE: 3      "

## 2021-09-30 LAB
C TRACH DNA SPEC QL NAA+PROBE: NEGATIVE
N GONORRHOEA DNA SPEC QL NAA+PROBE: NEGATIVE

## 2021-09-30 ASSESSMENT — PATIENT HEALTH QUESTIONNAIRE - PHQ9: SUM OF ALL RESPONSES TO PHQ QUESTIONS 1-9: 2

## 2021-09-30 ASSESSMENT — ANXIETY QUESTIONNAIRES: GAD7 TOTAL SCORE: 3

## 2022-10-15 ENCOUNTER — HEALTH MAINTENANCE LETTER (OUTPATIENT)
Age: 27
End: 2022-10-15

## 2022-11-27 ENCOUNTER — HEALTH MAINTENANCE LETTER (OUTPATIENT)
Age: 27
End: 2022-11-27

## 2024-01-07 ENCOUNTER — HEALTH MAINTENANCE LETTER (OUTPATIENT)
Age: 29
End: 2024-01-07